# Patient Record
Sex: MALE | Race: WHITE | NOT HISPANIC OR LATINO | Employment: FULL TIME | URBAN - METROPOLITAN AREA
[De-identification: names, ages, dates, MRNs, and addresses within clinical notes are randomized per-mention and may not be internally consistent; named-entity substitution may affect disease eponyms.]

---

## 2024-02-13 ENCOUNTER — OFFICE VISIT (OUTPATIENT)
Dept: URGENT CARE | Facility: CLINIC | Age: 39
End: 2024-02-13
Payer: COMMERCIAL

## 2024-02-13 VITALS
BODY MASS INDEX: 26.22 KG/M2 | SYSTOLIC BLOOD PRESSURE: 124 MMHG | TEMPERATURE: 97.7 F | RESPIRATION RATE: 18 BRPM | OXYGEN SATURATION: 98 % | HEART RATE: 76 BPM | HEIGHT: 69 IN | WEIGHT: 177 LBS | DIASTOLIC BLOOD PRESSURE: 85 MMHG

## 2024-02-13 DIAGNOSIS — B35.4 TINEA CORPORIS: Primary | ICD-10-CM

## 2024-02-13 DIAGNOSIS — L30.9 HAND DERMATITIS: ICD-10-CM

## 2024-02-13 PROCEDURE — 99213 OFFICE O/P EST LOW 20 MIN: CPT | Performed by: PHYSICIAN ASSISTANT

## 2024-02-13 RX ORDER — PREDNISONE 10 MG/1
TABLET ORAL
Qty: 21 TABLET | Refills: 0 | Status: SHIPPED | OUTPATIENT
Start: 2024-02-13

## 2024-02-13 RX ORDER — KETOCONAZOLE 20 MG/G
CREAM TOPICAL
Qty: 30 G | Refills: 0 | Status: SHIPPED | OUTPATIENT
Start: 2024-02-13

## 2024-02-13 NOTE — PATIENT INSTRUCTIONS
Take photos of lesions on right lower leg as well as lesions on hands.  Make sure that you capture skin changes between fingers and at palm of hands and at wrists to document.    Take additional photos to document course as appropriate.    Stop over-the-counter antifungal and start prescription antifungal medication.  When you apply that medication, rub into affected areas and beyond borders.    Take prednisone as instructed.  While on prednisone do not take any ibuprofen or ibuprofen like products.  May safely take Tylenol if needed.    Get some cotton gloves to wear underneath your work gloves.    Contact primary care offices soon as possible to arrange follow-up for approximately 10 to 14 days for reevaluation.

## 2024-02-13 NOTE — PROGRESS NOTES
[facility]    NAME: Carlos Amador is a 38 y.o. male  : 1985    MRN: [id number]  DATE: 2024  TIME: 1:16 PM    Assessment and Plan   Tinea corporis [B35.4]  1. Tinea corporis  ketoconazole (NIZORAL) 2 % cream      2. Hand dermatitis  predniSONE 10 mg tablet        Skin findings do not coalesce red or green with black light examination.  Lesions on hands do not appear more prominent with black light exam.      Patient Instructions     Patient Instructions   Take photos of lesions on right lower leg as well as lesions on hands.  Make sure that you capture skin changes between fingers and at palm of hands and at wrists to document.    Take additional photos to document course as appropriate.    Stop over-the-counter antifungal and start prescription antifungal medication.  When you apply that medication, rub into affected areas and beyond borders.    Take prednisone as instructed.  While on prednisone do not take any ibuprofen or ibuprofen like products.  May safely take Tylenol if needed.    Get some cotton gloves to wear underneath your work gloves.    Contact primary care offices soon as possible to arrange follow-up for approximately 10 to 14 days for reevaluation.    Chief Complaint     Chief Complaint   Patient presents with    Rash     Patient with dry hand for over a month. Using OTC meds and no relief. Skin is getting worse       History of Present Illness   Carlos Amador presents to the clinic c/o  38-year-old right-hand-dominant male comes in with skin irritation both hands as well as some skin irritation right lower leg.    Patient says that he started some irritation of his left hand about a month ago between fingers and at wrist.  It has seemed to spread and now also on right hand.  Itching and burning.  Scaling.  About a week ago he noted some ringlike lesions on his right lower lateral leg that have been itchy.    He started using clotrimazole over-the-counter cream  "but does not seem to notice any improvement.  Has been applying it 4 times a day.  He has also been using a generic Aquaphor ointment but that does not seem to be helping.    Works in construction building warehouses.  Typically will wear a leather like glove or rubber gloves.  Denies any known chemical exposure onto his hands.  No soaps or detergents that are new.            Review of Systems   Review of Systems   Constitutional: Negative.    Skin:  Positive for rash.       Current Medications     Long-Term Medications   Medication Sig Dispense Refill    ketoconazole (NIZORAL) 2 % cream Applied to affected area b.i.d. 30 g 0       Current Allergies     Allergies as of 02/13/2024    (No Known Allergies)          The following portions of the patient's history were reviewed and updated as appropriate: allergies, current medications, past family history, past medical history, past social history, past surgical history and problem list.  History reviewed. No pertinent past medical history.  History reviewed. No pertinent surgical history.  History reviewed. No pertinent family history.    Objective   /85   Pulse 76   Temp 97.7 °F (36.5 °C) (Tympanic)   Resp 18   Ht 5' 9\" (1.753 m)   Wt 80.3 kg (177 lb)   SpO2 98%   BMI 26.14 kg/m²   No LMP for male patient.       Physical Exam     Physical Exam  Vitals and nursing note reviewed.   Constitutional:       General: He is not in acute distress.     Appearance: Normal appearance. He is well-developed. He is not ill-appearing, toxic-appearing or diaphoretic.   Cardiovascular:      Rate and Rhythm: Normal rate and regular rhythm.   Pulmonary:      Effort: Pulmonary effort is normal.   Skin:     General: Skin is warm and dry.      Findings: Erythema, lesion and rash present.      Comments: Right lateral lower leg with annular lesions x 4 with largest approximately 0.5 cm with mild central clearing and mild prominent border with little bit of scaling.  Other lesions " on leg similar.  Hands with increased redness in plaque formation, dryness, scaling between fingers, wrists and palms left hand greater than right with some red papules and coalescing dryness.  No obvious vesicles, pustules.  No active bleeding.  Distribution may favor some contact dermatitis.  Does not extend proximally up forearm from wrist.   Neurological:      General: No focal deficit present.      Mental Status: He is alert and oriented to person, place, and time.   Psychiatric:         Mood and Affect: Mood normal.         Behavior: Behavior normal.